# Patient Record
Sex: MALE | Race: WHITE
[De-identification: names, ages, dates, MRNs, and addresses within clinical notes are randomized per-mention and may not be internally consistent; named-entity substitution may affect disease eponyms.]

---

## 2019-04-01 ENCOUNTER — HOSPITAL ENCOUNTER (EMERGENCY)
Dept: HOSPITAL 25 - UCCORT | Age: 54
Discharge: HOME | End: 2019-04-01
Payer: COMMERCIAL

## 2019-04-01 VITALS — SYSTOLIC BLOOD PRESSURE: 135 MMHG | DIASTOLIC BLOOD PRESSURE: 80 MMHG

## 2019-04-01 DIAGNOSIS — M54.5: Primary | ICD-10-CM

## 2019-04-01 DIAGNOSIS — Z79.899: ICD-10-CM

## 2019-04-01 DIAGNOSIS — Z87.891: ICD-10-CM

## 2019-04-01 DIAGNOSIS — I10: ICD-10-CM

## 2019-04-01 PROCEDURE — 99212 OFFICE O/P EST SF 10 MIN: CPT

## 2019-04-01 PROCEDURE — G0463 HOSPITAL OUTPT CLINIC VISIT: HCPCS

## 2021-08-06 NOTE — UC
Back Pain HPI





- HPI Summary


HPI Summary: 


53-year-old male comes in with a chief complaint of low back pain.  Patient 

believes the pain started when he was pushing his lawnmower about a week ago.  

Pains in the low back it's spreads across bilaterally.  Feels like it's locked 

up.  No pain radiating down into the buttocks or the legs.  Occasionally 

patient reports some tingling.  No weakness.  No numbness at this time.  The 

difficulty controlling urine or bowels.  Took some ibuprofen which did help 

decrease the pain some.  Movement makes the pain worse.  No abdominal pain.  No 

shortness of breath.





- History of Current Complaint


Chief Complaint: UCBackPain


Stated Complaint: BACK INJURY


Time Seen by Provider: 04/01/19 11:48


Pain Intensity: 8





- Allergies/Home Medications


Allergies/Adverse Reactions: 


 Allergies











Allergy/AdvReac Type Severity Reaction Status Date / Time


 


No Known Allergies Allergy   Verified 04/01/19 11:25











Home Medications: 


 Home Medications





Atorvastatin* [Lipitor*] 10 mg PO DAILY 04/01/19 [History Confirmed 04/01/19]


Citalopram TAB* [CeleXA TAB*] 40 mg PO DAILY 04/01/19 [History Confirmed 04/01/ 19]


Ibuprofen TAB* [Advil TAB*] 400 mg PO Q6H PRN 04/01/19 [History Confirmed 04/01/ 19]


Lidocain Patch, Otc PRN 04/01/19 [History]


Lisinopril/HCTZ 20/12.5(NF) [Zestoretic 20/12.5(NF)] 1 tab PO DAILY 04/01/19 [

History Confirmed 04/01/19]


Meloxicam [Mobic] 7.5 mg PO DAILY 04/01/19 [History Confirmed 04/01/19]











PMH/Surg Hx/FS Hx/Imm Hx


Previously Healthy: Yes


Endocrine History: Dyslipidemia


Cardiovascular History: Hypertension





- Surgical History


Surgical History: Yes


Surgery Procedure, Year, and Place: CERVICAL DISC NERVE COMPRESSION WITH 

HARDWEAR





- Family History


Known Family History: Positive: Non-Contributory





- Social History


Alcohol Use: Weekly


Substance Use Type: Marijuana


Substance Use Comment - Amount & Last Used: DAILY


Smoking Status (MU): Former Smoker


Type: Cigarettes


When Did the Patient Quit Smoking/Using Tobacco: 2004





Review of Systems


All Other Systems Reviewed And Are Negative: Yes


Constitutional: Positive: Negative


Skin: Positive: Negative


Eyes: Positive: Negative


ENT: Positive: Negative


Respiratory: Positive: Negative


Cardiovascular: Positive: Negative


Gastrointestinal: Positive: Negative


Motor: Positive: Negative


Neurovascular: Positive: Negative


Musculoskeletal: Positive: Other: - SEE HPI


Neurological: Positive: Other - SEE HPI


Psychological: Positive: Negative


Is Patient Immunocompromised?: No





Physical Exam


Triage Information Reviewed: Yes


Appearance: Well-Appearing, Well-Nourished, Pain Distress - MILD WITH ROM


Vital Signs: 


 Initial Vital Signs











Temp  98.1 F   04/01/19 11:29


 


Pulse  63   04/01/19 11:29


 


Resp  16   04/01/19 11:29


 


BP  135/80   04/01/19 11:29


 


Pulse Ox  98   04/01/19 11:29











Vital Signs Reviewed: Yes


Eye Exam: Normal


Eyes: Positive: Conjunctiva Clear


Neck: Positive: Supple


Respiratory: Positive: Lungs clear, Normal breath sounds, No respiratory 

distress


Abdomen Description: Positive: Nontender, Soft


Musculoskeletal: Positive: Other: - Tender to palpation low back.  Legs and 

feet have full range of motion full-strength no sensation deficit.  Pain in 

back does increase with flexion bilaterally.


Neurological Exam: Normal


Neurological: Positive: Muscle Tone Normal


Psychological Exam: Normal


Psychological: Positive: Age Appropriate Behavior


Skin Exam: Normal





Back Pain Course/Dx





- Differential Dx/Diagnosis


Provider Diagnosis: 


 Low back pain








Discharge





- Sign-Out/Discharge


Documenting (check all that apply): Patient Departure


All imaging exams completed and their final reports reviewed: No Studies





- Discharge Plan


Condition: Stable


Disposition: HOME


Prescriptions: 


Cyclobenzaprine TAB* [Flexeril 10 MG TAB*] 10 mg PO TID PRN #15 tab MDD 3


 PRN Reason: Pain


HYDROcodone/ACETAMIN 5-325 MG* [Norco 5-325 TAB*] 1 tab PO Q4H PRN #30 tab MDD 6


 PRN Reason: Pain


Ibuprofen TAB* [Motrin TAB* 600 MG] 600 mg PO Q6H PRN #30 tab


 PRN Reason: Pain


Patient Education Materials:  Acute Low Back Pain (ED), Lower Back Exercises (ED

)


Referrals: 


LASHELL Eckert [Primary Care Provider] - 


Additional Instructions: 


FOLLOW UP WITH YOUR DOCTOR IF NOT COMPLETELY IMPROVED.


GET REEVALUATED SOONER IF YOUR CONDITION WORSENS; WEAKNESS, NUMBNESS, 

DIFFICULTY CONTROLLING BOWEL OR BLADDER OR ANY QUESTIONS OR CONCERNS.











- Billing Disposition and Condition


Condition: STABLE


Disposition: Home no